# Patient Record
Sex: FEMALE | Race: BLACK OR AFRICAN AMERICAN | NOT HISPANIC OR LATINO | Employment: FULL TIME | RURAL
[De-identification: names, ages, dates, MRNs, and addresses within clinical notes are randomized per-mention and may not be internally consistent; named-entity substitution may affect disease eponyms.]

---

## 2017-02-28 ENCOUNTER — OFFICE VISIT (OUTPATIENT)
Dept: RETAIL CLINIC | Facility: CLINIC | Age: 32
End: 2017-02-28

## 2017-02-28 VITALS
OXYGEN SATURATION: 99 % | TEMPERATURE: 98.2 F | HEIGHT: 62 IN | SYSTOLIC BLOOD PRESSURE: 124 MMHG | DIASTOLIC BLOOD PRESSURE: 60 MMHG | HEART RATE: 80 BPM | BODY MASS INDEX: 34.23 KG/M2 | WEIGHT: 186 LBS

## 2017-02-28 DIAGNOSIS — H10.023 PINK EYE, BILATERAL: Primary | ICD-10-CM

## 2017-02-28 DIAGNOSIS — H66.002 ACUTE SUPPURATIVE OTITIS MEDIA OF LEFT EAR WITHOUT SPONTANEOUS RUPTURE OF TYMPANIC MEMBRANE, RECURRENCE NOT SPECIFIED: ICD-10-CM

## 2017-02-28 PROCEDURE — 99213 OFFICE O/P EST LOW 20 MIN: CPT | Performed by: NURSE PRACTITIONER

## 2017-02-28 RX ORDER — GENTAMICIN SULFATE 3 MG/ML
2 SOLUTION/ DROPS OPHTHALMIC 4 TIMES DAILY
Qty: 5 ML | Refills: 0 | Status: SHIPPED | OUTPATIENT
Start: 2017-02-28 | End: 2017-03-07

## 2017-02-28 RX ORDER — CEFDINIR 300 MG/1
300 CAPSULE ORAL 2 TIMES DAILY
Qty: 20 CAPSULE | Refills: 0 | Status: SHIPPED | OUTPATIENT
Start: 2017-02-28 | End: 2017-03-16 | Stop reason: SDUPTHER

## 2017-02-28 NOTE — PATIENT INSTRUCTIONS
Otitis Media, Adult  Otitis media is redness, soreness, and inflammation of the middle ear. Otitis media may be caused by allergies or, most commonly, by infection. Often it occurs as a complication of the common cold.  SIGNS AND SYMPTOMS  Symptoms of otitis media may include:  · Earache.  · Fever.  · Ringing in your ear.  · Headache.  · Leakage of fluid from the ear.  DIAGNOSIS  To diagnose otitis media, your health care provider will examine your ear with an otoscope. This is an instrument that allows your health care provider to see into your ear in order to examine your eardrum. Your health care provider also will ask you questions about your symptoms.  TREATMENT   Typically, otitis media resolves on its own within 3-5 days. Your health care provider may prescribe medicine to ease your symptoms of pain. If otitis media does not resolve within 5 days or is recurrent, your health care provider may prescribe antibiotic medicines if he or she suspects that a bacterial infection is the cause.  HOME CARE INSTRUCTIONS   · If you were prescribed an antibiotic medicine, finish it all even if you start to feel better.  · Take medicines only as directed by your health care provider.  · Keep all follow-up visits as directed by your health care provider.  SEEK MEDICAL CARE IF:  · You have otitis media only in one ear, or bleeding from your nose, or both.  · You notice a lump on your neck.  · You are not getting better in 3-5 days.  · You feel worse instead of better.  SEEK IMMEDIATE MEDICAL CARE IF:   · You have pain that is not controlled with medicine.  · You have swelling, redness, or pain around your ear or stiffness in your neck.  · You notice that part of your face is paralyzed.  · You notice that the bone behind your ear (mastoid) is tender when you touch it.  MAKE SURE YOU:   · Understand these instructions.  · Will watch your condition.  · Will get help right away if you are not doing well or get worse.     This  information is not intended to replace advice given to you by your health care provider. Make sure you discuss any questions you have with your health care provider.     Document Released: 09/22/2005 Document Revised: 01/08/2016 Document Reviewed: 07/15/2014  [employer] Interactive Patient Education ©2016 [employer]  Bacterial Conjunctivitis  Bacterial conjunctivitis, commonly called pink eye, is an inflammation of the clear membrane that covers the white part of the eye (conjunctiva). The inflammation can also happen on the underside of the eyelids. The blood vessels in the conjunctiva become inflamed, causing the eye to become red or pink. Bacterial conjunctivitis may spread easily from one eye to another and from person to person (contagious).   CAUSES   Bacterial conjunctivitis is caused by bacteria. The bacteria may come from your own skin, your upper respiratory tract, or from someone else with bacterial conjunctivitis.  SYMPTOMS   The normally white color of the eye or the underside of the eyelid is usually pink or red. The pink eye is usually associated with irritation, tearing, and some sensitivity to light. Bacterial conjunctivitis is often associated with a thick, yellowish discharge from the eye. The discharge may turn into a crust on the eyelids overnight, which causes your eyelids to stick together. If a discharge is present, there may also be some blurred vision in the affected eye.  DIAGNOSIS   Bacterial conjunctivitis is diagnosed by your caregiver through an eye exam and the symptoms that you report. Your caregiver looks for changes in the surface tissues of your eyes, which may point to the specific type of conjunctivitis. A sample of any discharge may be collected on a cotton-tip swab if you have a severe case of conjunctivitis, if your cornea is affected, or if you keep getting repeat infections that do not respond to treatment. The sample will be sent to a lab to see if the inflammation is  caused by a bacterial infection and to see if the infection will respond to antibiotic medicines.  TREATMENT   · Bacterial conjunctivitis is treated with antibiotics. Antibiotic eyedrops are most often used. However, antibiotic ointments are also available. Antibiotics pills are sometimes used. Artificial tears or eye washes may ease discomfort.  HOME CARE INSTRUCTIONS   · To ease discomfort, apply a cool, clean washcloth to your eye for 10-20 minutes, 3-4 times a day.  · Gently wipe away any drainage from your eye with a warm, wet washcloth or a cotton ball.  · Wash your hands often with soap and water. Use paper towels to dry your hands.  · Do not share towels or washcloths. This may spread the infection.  · Change or wash your pillowcase every day.  · You should not use eye makeup until the infection is gone.  · Do not operate machinery or drive if your vision is blurred.  · Stop using contact lenses. Ask your caregiver how to sterilize or replace your contacts before using them again. This depends on the type of contact lenses that you use.  · When applying medicine to the infected eye, do not touch the edge of your eyelid with the eyedrop bottle or ointment tube.  SEEK IMMEDIATE MEDICAL CARE IF:   · Your infection has not improved within 3 days after beginning treatment.  · You had yellow discharge from your eye and it returns.  · You have increased eye pain.  · Your eye redness is spreading.  · Your vision becomes blurred.  · You have a fever or persistent symptoms for more than 2-3 days.  · You have a fever and your symptoms suddenly get worse.  · You have facial pain, redness, or swelling.  MAKE SURE YOU:   · Understand these instructions.  · Will watch your condition.  · Will get help right away if you are not doing well or get worse.     This information is not intended to replace advice given to you by your health care provider. Make sure you discuss any questions you have with your health care  provider.     Document Released: 12/18/2006 Document Revised: 01/08/2016 Document Reviewed: 09/29/2016  [employer] Interactive Patient Education ©2016 [employer]

## 2017-02-28 NOTE — PROGRESS NOTES
Subjective   Keisha Oconnell is a 31 y.o. female.     Earache    There is pain in the left ear. This is a new problem. The current episode started in the past 7 days. The problem occurs constantly. The problem has been unchanged. There has been no fever. The pain is at a severity of 6/10. The pain is mild. Associated symptoms include rhinorrhea and a sore throat ( last week, better now). Pertinent negatives include no abdominal pain, coughing, diarrhea, ear discharge, headaches, hearing loss, neck pain, rash or vomiting. She has tried nothing for the symptoms. There is no history of a chronic ear infection or a tympanostomy tube.   Conjunctivitis    The current episode started yesterday. The problem occurs continuously. The problem has been gradually worsening. The problem is mild. Nothing relieves the symptoms. Nothing aggravates the symptoms. Associated symptoms include decreased vision ( blurry), eye itching, congestion, ear pain, rhinorrhea, sore throat ( last week, better now), URI, eye discharge and eye redness. Pertinent negatives include no fever, no double vision, no photophobia, no abdominal pain, no diarrhea, no nausea, no vomiting, no ear discharge, no headaches, no hearing loss, no mouth sores, no stridor, no swollen glands, no neck pain, no cough, no rash and no eye pain.        The following portions of the patient's history were reviewed and updated as appropriate: allergies, current medications, past medical history and past social history.    Review of Systems   Constitutional: Negative for activity change, appetite change and fever.   HENT: Positive for congestion, ear pain, rhinorrhea and sore throat ( last week, better now). Negative for ear discharge, hearing loss and mouth sores.    Eyes: Positive for discharge, redness and itching. Negative for double vision, photophobia and pain.   Respiratory: Negative for cough, chest tightness and stridor.    Cardiovascular: Negative.    Gastrointestinal:  "Negative for abdominal pain, diarrhea, nausea and vomiting.   Musculoskeletal: Negative for myalgias, neck pain and neck stiffness.   Skin: Negative.  Negative for rash.   Neurological: Negative for dizziness and headaches.   Hematological: Negative for adenopathy.   Psychiatric/Behavioral: Negative.        Objective    Visit Vitals   • /60 (BP Location: Left arm, Patient Position: Sitting, Cuff Size: Adult)   • Pulse 80   • Temp 98.2 °F (36.8 °C) (Tympanic)   • Ht 62\" (157.5 cm)   • Wt 186 lb (84.4 kg)   • LMP 02/01/2017   • SpO2 99%   • BMI 34.02 kg/m2       Physical Exam   Constitutional: She is oriented to person, place, and time. She appears well-developed. No distress.   HENT:   Head: Normocephalic and atraumatic.   Right Ear: Tympanic membrane and ear canal normal.   Left Ear: Ear canal normal. Tympanic membrane is erythematous and bulging.   Nose: Mucosal edema ( mildly injected, stuffed) present. Right sinus exhibits no maxillary sinus tenderness and no frontal sinus tenderness. Left sinus exhibits no maxillary sinus tenderness and no frontal sinus tenderness.   Mouth/Throat: Uvula is midline, oropharynx is clear and moist and mucous membranes are normal.   Eyes: Right eye exhibits no discharge. Left eye exhibits discharge ( purulent).   Conjunctiva injected bilaterally     Neck: Normal range of motion. Neck supple.   Cardiovascular: Normal rate and regular rhythm.    Pulmonary/Chest: Effort normal and breath sounds normal. She has no wheezes. She has no rales.   Lymphadenopathy:     She has no cervical adenopathy.   Neurological: She is alert and oriented to person, place, and time.   Psychiatric: She has a normal mood and affect. Her behavior is normal.   Nursing note and vitals reviewed.      Assessment/Plan   Kiesha was seen today for earache and facial swelling.    Diagnoses and all orders for this visit:    Pink eye, bilateral  Comments:  Left>Right  Orders:  -     gentamicin (GARAMYCIN) 0.3 % " ophthalmic solution; Administer 2 drops to both eyes 4 (Four) Times a Day for 7 days.    Acute suppurative otitis media of left ear without spontaneous rupture of tympanic membrane, recurrence not specified  -     cefdinir (OMNICEF) 300 MG capsule; Take 1 capsule by mouth 2 (Two) Times a Day.      Push fluids  Rest  Tylenol or Motrin as needed  Good handwashing.  Switch out pillowcases.    Discard any recently used eye makeup, contacts.     OTC decongestant of choice.      PCP or eye doctor if eyes not improving in the next 48-72 hours.  PCP if earache persists.      RTW: 3-1-17

## 2017-03-16 ENCOUNTER — DOCUMENTATION (OUTPATIENT)
Dept: RETAIL CLINIC | Facility: CLINIC | Age: 32
End: 2017-03-16

## 2017-03-16 DIAGNOSIS — H66.002 ACUTE SUPPURATIVE OTITIS MEDIA OF LEFT EAR WITHOUT SPONTANEOUS RUPTURE OF TYMPANIC MEMBRANE, RECURRENCE NOT SPECIFIED: ICD-10-CM

## 2017-03-16 RX ORDER — CEFDINIR 300 MG/1
300 CAPSULE ORAL 2 TIMES DAILY
Qty: 20 CAPSULE | Refills: 0 | Status: SHIPPED | OUTPATIENT
Start: 2017-03-16 | End: 2017-05-12

## 2017-03-16 NOTE — PROGRESS NOTES
"Patient seen on 2-28-17 for left OM.  Reports she is still having some pain and \"fullness\".  Will refill Omnicef.  Recommend she continue with Sudafed.  If symptoms persist, see PCP for further evaluation and treatment.   "

## 2017-05-12 ENCOUNTER — OFFICE VISIT (OUTPATIENT)
Dept: RETAIL CLINIC | Facility: CLINIC | Age: 32
End: 2017-05-12

## 2017-05-12 VITALS
HEIGHT: 62 IN | OXYGEN SATURATION: 99 % | RESPIRATION RATE: 16 BRPM | DIASTOLIC BLOOD PRESSURE: 84 MMHG | WEIGHT: 179 LBS | BODY MASS INDEX: 32.94 KG/M2 | TEMPERATURE: 98.7 F | HEART RATE: 84 BPM | SYSTOLIC BLOOD PRESSURE: 120 MMHG

## 2017-05-12 DIAGNOSIS — L84 CORN OR CALLUS: Primary | ICD-10-CM

## 2017-05-12 PROCEDURE — 99212 OFFICE O/P EST SF 10 MIN: CPT | Performed by: NURSE PRACTITIONER

## 2017-05-17 ENCOUNTER — OFFICE VISIT (OUTPATIENT)
Dept: RETAIL CLINIC | Facility: CLINIC | Age: 32
End: 2017-05-17

## 2017-05-17 VITALS
DIASTOLIC BLOOD PRESSURE: 78 MMHG | TEMPERATURE: 99.9 F | HEART RATE: 102 BPM | HEIGHT: 62 IN | OXYGEN SATURATION: 99 % | SYSTOLIC BLOOD PRESSURE: 118 MMHG | WEIGHT: 180 LBS | RESPIRATION RATE: 18 BRPM | BODY MASS INDEX: 33.13 KG/M2

## 2017-05-17 DIAGNOSIS — J06.9 URI WITH COUGH AND CONGESTION: Primary | ICD-10-CM

## 2017-05-17 DIAGNOSIS — L08.9 INFECTION OF SKIN OF TOES: ICD-10-CM

## 2017-05-17 PROCEDURE — 99213 OFFICE O/P EST LOW 20 MIN: CPT | Performed by: NURSE PRACTITIONER

## 2017-05-17 RX ORDER — CEPHALEXIN 500 MG/1
500 CAPSULE ORAL 2 TIMES DAILY
Qty: 20 CAPSULE | Refills: 0 | Status: SHIPPED | OUTPATIENT
Start: 2017-05-17 | End: 2021-03-25 | Stop reason: HOSPADM

## 2017-05-17 RX ORDER — BROMPHENIRAMINE MALEATE, PSEUDOEPHEDRINE HYDROCHLORIDE, AND DEXTROMETHORPHAN HYDROBROMIDE 2; 30; 10 MG/5ML; MG/5ML; MG/5ML
SYRUP ORAL
Qty: 240 ML | Refills: 0 | Status: SHIPPED | OUTPATIENT
Start: 2017-05-17 | End: 2021-03-25 | Stop reason: HOSPADM

## 2017-05-30 ENCOUNTER — DOCUMENTATION (OUTPATIENT)
Dept: RETAIL CLINIC | Facility: CLINIC | Age: 32
End: 2017-05-30

## 2021-03-25 ENCOUNTER — OFFICE VISIT (OUTPATIENT)
Dept: OBSTETRICS AND GYNECOLOGY | Facility: CLINIC | Age: 36
End: 2021-03-25

## 2021-03-25 VITALS
HEIGHT: 62 IN | BODY MASS INDEX: 39.75 KG/M2 | WEIGHT: 216 LBS | DIASTOLIC BLOOD PRESSURE: 70 MMHG | SYSTOLIC BLOOD PRESSURE: 124 MMHG

## 2021-03-25 DIAGNOSIS — Z12.4 ENCOUNTER FOR PAPANICOLAOU SMEAR FOR CERVICAL CANCER SCREENING: ICD-10-CM

## 2021-03-25 DIAGNOSIS — Z01.411 ENCOUNTER FOR WELL WOMAN EXAM WITH ABNORMAL FINDINGS: Primary | ICD-10-CM

## 2021-03-25 DIAGNOSIS — Z11.3 SCREENING EXAMINATION FOR SEXUALLY TRANSMITTED DISEASE: ICD-10-CM

## 2021-03-25 DIAGNOSIS — N91.1 SECONDARY AMENORRHEA: ICD-10-CM

## 2021-03-25 PROCEDURE — 87591 N.GONORRHOEAE DNA AMP PROB: CPT | Performed by: NURSE PRACTITIONER

## 2021-03-25 PROCEDURE — 87491 CHLMYD TRACH DNA AMP PROBE: CPT | Performed by: NURSE PRACTITIONER

## 2021-03-25 PROCEDURE — 87661 TRICHOMONAS VAGINALIS AMPLIF: CPT | Performed by: NURSE PRACTITIONER

## 2021-03-25 PROCEDURE — 99214 OFFICE O/P EST MOD 30 MIN: CPT | Performed by: NURSE PRACTITIONER

## 2021-03-25 PROCEDURE — 99385 PREV VISIT NEW AGE 18-39: CPT | Performed by: NURSE PRACTITIONER

## 2021-03-25 RX ORDER — LOSARTAN POTASSIUM 100 MG/1
TABLET ORAL
COMMUNITY
Start: 2021-02-24

## 2021-03-25 RX ORDER — METOPROLOL SUCCINATE 25 MG/1
TABLET, EXTENDED RELEASE ORAL
COMMUNITY
Start: 2021-02-24

## 2021-03-25 RX ORDER — ATORVASTATIN CALCIUM 20 MG/1
TABLET, FILM COATED ORAL
COMMUNITY
Start: 2021-03-01

## 2021-03-25 RX ORDER — PHENTERMINE HYDROCHLORIDE 30 MG/1
30 CAPSULE ORAL EVERY MORNING
COMMUNITY

## 2021-03-25 NOTE — PROGRESS NOTES
Subjective   Keisha Oconnell is a 35 y.o. here for gyn annual    LMP: unsure, spotting last   BC: none  Pap: Unsure, possibly in      Keisha Oconnell is a 35 yr old  who presents today for a gyn annual. She is a new patient. Is unsure of her last period, possibly in 2020. Has had irregular periods for years. Denies going without a year without a period. Desires STI screening today. BMI 39; takes phetermine for weight loss. Was previously seen at [facility] and [place] for her irregular periods.     Gynecologic Exam  The patient's pertinent negatives include no genital itching, genital lesions, genital odor, genital rash, missed menses, pelvic pain, vaginal bleeding or vaginal discharge. Pertinent negatives include no abdominal pain, chills, constipation, diarrhea, dysuria, fever, frequency, nausea, rash or sore throat. She is sexually active. No, her partner does not have an STD. She uses nothing for contraception. Her menstrual history has been irregular. There is no history of a  section, endometriosis or an STD.   Amenorrhea  This is a chronic problem. The current episode started more than 1 month ago. The problem occurs constantly. The problem has been unchanged. Pertinent negatives include no abdominal pain, chest pain, chills, fatigue, fever, nausea, rash, sore throat or weakness. Nothing aggravates the symptoms. Treatments tried: Provera in the past. The treatment provided mild relief.       The following portions of the patient's history were reviewed and updated as appropriate: allergies, current medications, past family history, past medical history, past social history, past surgical history and problem list.    Review of Systems   Constitutional: Negative for chills, fatigue, fever, unexpected weight gain and unexpected weight loss.   HENT: Negative for sneezing and sore throat.    Respiratory: Negative for shortness of breath.    Cardiovascular: Negative for  chest pain and palpitations.   Gastrointestinal: Negative for abdominal pain, constipation, diarrhea and nausea.   Endocrine: Negative for cold intolerance and heat intolerance.   Genitourinary: Positive for amenorrhea and menstrual problem. Negative for breast discharge, breast lump, breast pain, difficulty urinating, dysuria, frequency, missed menses, pelvic pain, pelvic pressure, urinary incontinence, vaginal bleeding, vaginal discharge and vaginal pain.   Skin: Negative for rash.   Neurological: Negative for weakness and headache.   Psychiatric/Behavioral: Negative for sleep disturbance, depressed mood and stress.       Objective   Physical Exam  Vitals and nursing note reviewed. Exam conducted with a chaperone present.   Constitutional:       Appearance: She is well-developed. She is obese.   HENT:      Head: Normocephalic and atraumatic.   Eyes:      Conjunctiva/sclera: Conjunctivae normal.   Neck:      Thyroid: No thyromegaly.   Cardiovascular:      Rate and Rhythm: Normal rate and regular rhythm.      Heart sounds: Normal heart sounds.   Pulmonary:      Effort: Pulmonary effort is normal. No respiratory distress.      Breath sounds: Normal breath sounds.   Chest:      Breasts:         Right: No swelling, bleeding, inverted nipple, mass, nipple discharge, skin change or tenderness.         Left: No swelling, bleeding, inverted nipple, mass, nipple discharge, skin change or tenderness.   Abdominal:      General: Bowel sounds are normal. There is no distension.      Palpations: Abdomen is soft.      Tenderness: There is no abdominal tenderness.   Genitourinary:     General: Normal vulva.      Labia:         Right: No rash, tenderness, lesion or injury.         Left: No rash, tenderness, lesion or injury.       Vagina: Normal.      Cervix: Normal.      Uterus: Normal.       Adnexa: Right adnexa normal and left adnexa normal.      Rectum: Normal.      Comments: Pap cotesting and G/C collected.   Musculoskeletal:          General: Normal range of motion.      Cervical back: Normal range of motion and neck supple.   Skin:     General: Skin is warm and dry.   Neurological:      Mental Status: She is alert and oriented to person, place, and time.   Psychiatric:         Behavior: Behavior normal.           Assessment/Plan   Diagnoses and all orders for this visit:    1. Encounter for well woman exam with abnormal findings (Primary)    2. Encounter for Papanicolaou smear for cervical cancer screening  -     Liquid-based Pap Smear, Screening    3. Secondary amenorrhea  -     DHEA-Sulfate  -     Follicle Stimulating Hormone  -     hCG, Serum, Qualitative  -     Hemoglobin A1c  -     Luteinizing Hormone  -     Prolactin  -     Sex Horm Binding Globulin  -     Testosterone (Free & Total), LC / MS  -     TSH  -     US Non-ob Transvaginal; Future  -     Estradiol    4. Screening examination for sexually transmitted disease  -     Chlamydia trachomatis, Neisseria gonorrhoeae, Trichomonas vaginalis, PCR - Swab, Cervix  -     Hepatitis B Surface Antigen  -     Hepatitis C Antibody  -     HIV-1 & HIV-2 Antibodies  -     Cancel: RPR Quant  -     RPR    Other orders  -     Cancel: DHEA-Sulfate  -     Cancel: Follicle Stimulating Hormone  -     Cancel: hCG, Serum, Qualitative  -     Cancel: Hemoglobin A1c  -     Cancel: Luteinizing Hormone  -     Cancel: US Non-ob Transvaginal; Future  -     Cancel: Sex Horm Binding Globulin  -     Cancel: Testosterone (Free & Total), LC / MS  -     Cancel: TSH  -     Cancel: Prolactin          Work-up for secondary amenorrhea including US and labwork; pt to follow up in 2 weeks. Will request records from [place] and [place] Discussed need for EMB to rule out hyperplasia/uterine malignancy due to amenorrhea and obesity. Reviewed pap smear screening guidelines and breast awareness. Will call pt with abnormal result of STI screening.

## 2021-03-26 LAB
C TRACH RRNA CVX QL NAA+PROBE: NEGATIVE
N GONORRHOEA RRNA SPEC QL NAA+PROBE: NEGATIVE
TRICHOMONAS VAGINALIS PCR: NEGATIVE

## 2021-03-29 PROBLEM — E11.9 TYPE 2 DIABETES MELLITUS: Status: ACTIVE | Noted: 2017-05-24

## 2021-03-29 PROBLEM — E28.8 FEMALE HYPERGONADOTROPIC HYPOGONADISM: Status: ACTIVE | Noted: 2021-03-29

## 2021-03-29 PROBLEM — E28.39 PREMATURE OVARIAN FAILURE: Status: ACTIVE | Noted: 2017-05-24

## 2021-03-30 LAB
LAB AP CASE REPORT: NORMAL
PATH INTERP SPEC-IMP: NORMAL

## 2021-03-31 ENCOUNTER — LAB (OUTPATIENT)
Dept: LAB | Facility: HOSPITAL | Age: 36
End: 2021-03-31

## 2021-03-31 PROCEDURE — 86803 HEPATITIS C AB TEST: CPT | Performed by: NURSE PRACTITIONER

## 2021-03-31 PROCEDURE — 82627 DEHYDROEPIANDROSTERONE: CPT | Performed by: NURSE PRACTITIONER

## 2021-03-31 PROCEDURE — 83001 ASSAY OF GONADOTROPIN (FSH): CPT | Performed by: NURSE PRACTITIONER

## 2021-03-31 PROCEDURE — 84270 ASSAY OF SEX HORMONE GLOBUL: CPT | Performed by: NURSE PRACTITIONER

## 2021-03-31 PROCEDURE — 83002 ASSAY OF GONADOTROPIN (LH): CPT | Performed by: NURSE PRACTITIONER

## 2021-03-31 PROCEDURE — 84402 ASSAY OF FREE TESTOSTERONE: CPT | Performed by: NURSE PRACTITIONER

## 2021-03-31 PROCEDURE — 82670 ASSAY OF TOTAL ESTRADIOL: CPT | Performed by: NURSE PRACTITIONER

## 2021-03-31 PROCEDURE — 84443 ASSAY THYROID STIM HORMONE: CPT | Performed by: NURSE PRACTITIONER

## 2021-03-31 PROCEDURE — 87340 HEPATITIS B SURFACE AG IA: CPT | Performed by: NURSE PRACTITIONER

## 2021-03-31 PROCEDURE — G0432 EIA HIV-1/HIV-2 SCREEN: HCPCS | Performed by: NURSE PRACTITIONER

## 2021-03-31 PROCEDURE — 84703 CHORIONIC GONADOTROPIN ASSAY: CPT | Performed by: NURSE PRACTITIONER

## 2021-03-31 PROCEDURE — 86592 SYPHILIS TEST NON-TREP QUAL: CPT | Performed by: NURSE PRACTITIONER

## 2021-03-31 PROCEDURE — 87899 AGENT NOS ASSAY W/OPTIC: CPT | Performed by: NURSE PRACTITIONER

## 2021-03-31 PROCEDURE — 84146 ASSAY OF PROLACTIN: CPT | Performed by: NURSE PRACTITIONER

## 2021-03-31 PROCEDURE — 84403 ASSAY OF TOTAL TESTOSTERONE: CPT | Performed by: NURSE PRACTITIONER

## 2021-03-31 PROCEDURE — 83036 HEMOGLOBIN GLYCOSYLATED A1C: CPT | Performed by: NURSE PRACTITIONER

## 2021-04-01 DIAGNOSIS — N91.1 SECONDARY AMENORRHEA: ICD-10-CM

## 2021-04-01 LAB
ESTRADIOL SERPL HS-MCNC: <5 PG/ML
FSH SERPL-ACNC: 51.9 MIU/ML
HBA1C MFR BLD: 5.87 % (ref 4.8–5.6)
HBV SURFACE AG SERPL QL IA: NORMAL
HCG SERPL QL: NEGATIVE
HCV AB SER DONR QL: NORMAL
HIV1 P24 AG SER QL: NORMAL
HIV1+2 AB SER QL: NORMAL
HIV1+2 AB SER QL: NORMAL
LH SERPL-ACNC: 24.1 MIU/ML
PROLACTIN SERPL-MCNC: 9.48 NG/ML (ref 4.79–23.3)
RPR SER QL: NORMAL
TSH SERPL DL<=0.05 MIU/L-ACNC: 3.56 UIU/ML (ref 0.27–4.2)

## 2021-04-02 LAB
DHEA-S SERPL-MCNC: 85.1 UG/DL (ref 84.8–378)
SHBG SERPL-SCNC: 16.9 NMOL/L (ref 24.6–122)

## 2021-04-04 LAB
TESTOST FREE SERPL-MCNC: 1 PG/ML (ref 0–4.2)
TESTOST SERPL-MCNC: 20.9 NG/DL (ref 10–55)

## 2021-04-08 ENCOUNTER — OFFICE VISIT (OUTPATIENT)
Dept: OBSTETRICS AND GYNECOLOGY | Facility: CLINIC | Age: 36
End: 2021-04-08

## 2021-04-08 VITALS
DIASTOLIC BLOOD PRESSURE: 90 MMHG | SYSTOLIC BLOOD PRESSURE: 132 MMHG | BODY MASS INDEX: 39.93 KG/M2 | WEIGHT: 217 LBS | HEIGHT: 62 IN

## 2021-04-08 DIAGNOSIS — E28.39 PREMATURE OVARIAN FAILURE: Primary | ICD-10-CM

## 2021-04-08 PROCEDURE — 99213 OFFICE O/P EST LOW 20 MIN: CPT | Performed by: NURSE PRACTITIONER

## 2021-04-08 RX ORDER — MEDROXYPROGESTERONE ACETATE 10 MG/1
TABLET ORAL
Qty: 10 TABLET | Refills: 11 | Status: SHIPPED | OUTPATIENT
Start: 2021-04-08 | End: 2022-04-12

## 2021-04-08 RX ORDER — MEDROXYPROGESTERONE ACETATE 10 MG/1
TABLET ORAL
Qty: 10 TABLET | Refills: 11 | Status: SHIPPED | OUTPATIENT
Start: 2021-04-08 | End: 2021-04-08

## 2021-04-08 NOTE — PROGRESS NOTES
Subjective   Keisha Oconnell is a 35 y.o. here for follow up on labs    Keisha cOonnell is a 35 yr old who presents today for follow-up on labwork and ultrasound for secondary amenorrhea. Pt reports she was previously on HRT for POI but it has been more than a year. Accompanied by radha today. Expresses that she prefers to be on HRT versus birth control pills.       The following portions of the patient's history were reviewed and updated as appropriate: allergies, current medications, past family history, past medical history, past social history, past surgical history and problem list.    Review of Systems   Constitutional: Negative for chills, fatigue, fever, unexpected weight gain and unexpected weight loss.   HENT: Negative for sneezing and sore throat.    Respiratory: Negative for shortness of breath.    Cardiovascular: Negative for chest pain and palpitations.   Gastrointestinal: Negative for abdominal pain, constipation, diarrhea and nausea.   Endocrine: Positive for heat intolerance. Negative for cold intolerance.   Genitourinary: Positive for amenorrhea. Negative for breast discharge, breast lump, breast pain, difficulty urinating, dysuria, frequency, menstrual problem, pelvic pain, pelvic pressure, urinary incontinence, vaginal bleeding, vaginal discharge and vaginal pain.   Skin: Negative for rash.   Neurological: Negative for weakness and headache.   Psychiatric/Behavioral: Negative for sleep disturbance, depressed mood and stress.       Objective   Physical Exam  Vitals and nursing note reviewed.   Constitutional:       Appearance: She is well-developed. She is obese.   HENT:      Head: Normocephalic.   Pulmonary:      Effort: Pulmonary effort is normal.   Skin:     General: Skin is dry.   Neurological:      Mental Status: She is alert and oriented to person, place, and time.   Psychiatric:         Behavior: Behavior normal.           Assessment/Plan   Diagnoses and all orders for this visit:    1.  Premature ovarian failure (Primary)    Other orders  -     Discontinue: estrogens, conjugated, (Premarin) 0.625 MG tablet; Take 1 pill a day for 21 days then take Provera.  Dispense: 21 tablet; Refill: 12  -     Discontinue: medroxyPROGESTERone (Provera) 10 MG tablet; Take 1 tablet daily for 10 days.  Dispense: 10 tablet; Refill: 11  -     medroxyPROGESTERone (Provera) 10 MG tablet; Take 1 tablet for 10 days.  Dispense: 10 tablet; Refill: 11  -     Discontinue: estrogens, conjugated, (Premarin) 0.625 MG tablet; Take 1 tablet by mouth Daily. Take 1 tablet by mouth for 21 days and then take Provera for 10 days.  Dispense: 30 tablet; Refill: 12  -     estrogens, conjugated, (Premarin) 0.625 MG tablet; Take 1 tablet by mouth Daily. Take 1 tablet by mouth for 21 days and then take Provera for 10 days.  Dispense: 21 tablet; Refill: 11        Reviewed labwork; labs consistent with POI.  Pap is normal, repeat  In 5 years. Pt is prediabetic, encouraged continued weight loss and a healthy diet. Discussed treatment with HRT till the age of menopause which is around 51 years of age for prevention of osteoporosis and treatment for hot flashes. Pt agreeable to this plan. Discussed EMB is not indicated as pt has POI and pelvic ultrasound is WNL. Return in one year for refills.

## 2021-04-08 NOTE — PATIENT INSTRUCTIONS
Primary Ovarian Insufficiency    Primary ovarian insufficiency is a condition in which the ovaries stop working in women under age 40. The ovaries have a fixed number of eggs, which are stored in fluid-filled sacs (follicles). They also produce the female sex hormones, including estrogen. After puberty, female hormones trigger the release of an egg from a follicle (ovulation) each month. If the egg does not get fertilized by a sperm, a woman will have a menstrual period.  Throughout life, the number of follicles in the ovaries slowly declines. A condition called menopause occurs when there are no follicles left. When this occurs, ovulation stops and the level of estrogen drops. This is a natural process and occurs in all women by about age 55. If you have primary ovarian failure, the loss of follicles and ability to produce estrogen occurs at a much younger age.  What are the causes?  In most cases, the exact cause of this condition is not known. Some known causes include:  · Not having enough follicles. You are born with a certain number of follicles. You may not have enough to last 40 or more years.  · Cancer treatments that damage follicles. These include medicines (chemotherapy), high energy waves (radiation), or surgery.  · Follicles that do not respond to the brain hormone that tells follicles to release eggs (follicle-stimulating hormone or FSH).  · Having a genetic disorder that causes abnormal ovaries (De La Vega syndrome and fragile X syndrome).  · Having a condition in which your immune system attacks your ovaries (autoimmune disease).  What increases the risk?  You are more likely to develop this condition if:  · You smoke.  · You have diabetes.  · You have a family history of primary ovarian insufficiency.  What are the symptoms?  Symptoms of this condition include:  · Inability to get pregnant.  · Irregular or missed periods.  · Other symptoms can be caused by low levels of estrogen. They include:  ? Night  sweats.  ? Hot flashes.  ? Dry vagina, which can cause pain during sex.  ? Loss of interest in sex (low libido).  ? Irritability.  ? Trouble sleeping.  ? Confusion.  ? Thinning bones (osteoporosis), which may cause bones to break easily.  In many cases, there are no symptoms for this condition.  How is this diagnosed?  This condition may be diagnosed based on:  · Your symptoms. Your health care provider will ask you questions about your symptoms. He or she may suspect primary ovarian insufficiency if you have trouble getting pregnant or if you have irregular or missed periods.  · A physical exam.  · Blood tests. This is done to confirm the diagnosis. The test will check for:  ? Low levels of estrogen.  ? High levels of FSH. Follicle loss and low levels of estrogen will cause your brain to release more FSH.  ? Low levels of anti-Mullerian hormone (AMH). As follicles are lost, this hormone also decreases.  If you have low estrogen associated with low AMH and high FSH, your health care provider may do more tests to look for a possible cause of your primary ovarian insufficiency. These may include genetic testing and testing for autoimmune disease.  How is this treated?  This condition is treated with hormone replacement therapy, which replaces the female hormones estrogen and progesterone. These hormones may be given as oral medicines or as a skin patch. This treatment may:  · Relieve the symptoms of estrogen deficiency.  · Prevent osteoporosis.  · Protect from heart disease.  Treatment often goes on until around age 50 when menopause usually occurs. There is no treatment that can fully restore fertility. However, a small number of women can get pregnant after being diagnosed with this condition. If you desire to get pregnant now or in the future, talk to a fertility specialist right away about your options.  Follow these instructions at home:    · Take over-the-counter and prescription medicines only as told by your  health care provider.  · Ask your health care provider about the risks and side effects of hormone replacement therapy.  · Eat foods rich in calcium and vitamin D. These include milk, other dairy products, and orange juice or breakfast cereals that have vitamin D added (fortified). These may help to prevent osteoporosis.  · Do exercise regularly. This can make your muscles and bones stronger. Ask your health care provider which activities are safe for you.  · Do not use any products that contain nicotine or tobacco, such as cigarettes and e-cigarettes. If you need help quitting, ask your health care provider.  · Keep all follow-up visits as told by your health care provider. This is important.  Contact a health care provider if:  · You have symptoms of low estrogen.  · Your periods stop or are not regular.  · You have side effects from your medicines.  Get help right away if:  · You break (fracture) a bone.  · You have chest pain or trouble breathing.  Summary  · Primary ovarian insufficiency results when your ovaries stop working normally before age 40.  · In most cases, the exact cause of this condition is not known.  · The first sign of this disorder may be difficulty getting pregnant.  · Symptoms of this condition include irregular or missed periods and symptoms of low estrogen.  · This condition is treated with hormone replacement therapy. This reduces symptoms of low estrogen and protects from osteoporosis.  This information is not intended to replace advice given to you by your health care provider. Make sure you discuss any questions you have with your health care provider.  Document Revised: 04/10/2020 Document Reviewed: 03/27/2019  [employer] © 2021 [employer]

## 2022-04-12 RX ORDER — CONJUGATED ESTROGENS 0.62 MG/1
TABLET, FILM COATED ORAL
Qty: 21 TABLET | Refills: 11 | Status: SHIPPED | OUTPATIENT
Start: 2022-04-12

## 2022-04-12 RX ORDER — MEDROXYPROGESTERONE ACETATE 10 MG/1
TABLET ORAL
Qty: 10 TABLET | Refills: 11 | Status: SHIPPED | OUTPATIENT
Start: 2022-04-12 | End: 2022-08-10 | Stop reason: SDUPTHER

## 2022-04-12 RX ORDER — MEDROXYPROGESTERONE ACETATE 10 MG/1
TABLET ORAL
Qty: 10 TABLET | Refills: 0 | Status: SHIPPED | OUTPATIENT
Start: 2022-04-12 | End: 2022-04-12 | Stop reason: SDUPTHER

## 2022-08-10 RX ORDER — MEDROXYPROGESTERONE ACETATE 10 MG/1
TABLET ORAL
Qty: 10 TABLET | Refills: 0 | Status: SHIPPED | OUTPATIENT
Start: 2022-08-10

## 2023-02-20 RX ORDER — MEDROXYPROGESTERONE ACETATE 10 MG/1
TABLET ORAL
Qty: 10 TABLET | Refills: 0 | OUTPATIENT
Start: 2023-02-20

## 2023-04-13 ENCOUNTER — OFFICE VISIT (OUTPATIENT)
Dept: OBSTETRICS AND GYNECOLOGY | Facility: CLINIC | Age: 38
End: 2023-04-13
Payer: COMMERCIAL

## 2023-04-13 VITALS
WEIGHT: 213 LBS | DIASTOLIC BLOOD PRESSURE: 82 MMHG | SYSTOLIC BLOOD PRESSURE: 134 MMHG | HEIGHT: 62 IN | BODY MASS INDEX: 39.2 KG/M2

## 2023-04-13 DIAGNOSIS — R23.2 HOT FLASHES: ICD-10-CM

## 2023-04-13 DIAGNOSIS — E28.39 PREMATURE OVARIAN FAILURE: Primary | ICD-10-CM

## 2023-04-13 PROCEDURE — 99213 OFFICE O/P EST LOW 20 MIN: CPT | Performed by: NURSE PRACTITIONER

## 2023-04-13 RX ORDER — PAROXETINE 10 MG/1
10 TABLET, FILM COATED ORAL EVERY MORNING
Qty: 60 TABLET | Refills: 3 | Status: SHIPPED | OUTPATIENT
Start: 2023-04-13

## 2023-04-13 RX ORDER — ERGOCALCIFEROL 1.25 MG/1
CAPSULE ORAL
COMMUNITY
Start: 2023-02-21

## 2023-04-13 RX ORDER — HYDROCHLOROTHIAZIDE 25 MG/1
TABLET ORAL
COMMUNITY
Start: 2023-01-04

## 2023-04-13 NOTE — PROGRESS NOTES
Subjective   Keisha Oconnell is a 37 y.o. here for refill on hormones for POI    History of Present Illness  Keisha Oconnell is a 37 yr old  postmenopausal female with hx of POI presents today for refills on hormones. States the cost of her Premarin went from $50 to $170. She went without the pills for a few months, but feels she is having hot flashes. Pt, however, had Covid at the end of 2021 complicated by pulmonary embolism. She was on Premarin the entire time this occurred and while her blood clot was managed. She recently got off Xarelto, was told by her PCP she no longer needs them. She is no longer on Lipitor anymore, was told she does not have high cholesterol. She has started exercising and losing weight. She is considering IVF.         The following portions of the patient's history were reviewed and updated as appropriate: allergies, current medications, past family history, past medical history, past social history, past surgical history and problem list.    Review of Systems   Constitutional: Negative for chills, fatigue and fever.   Respiratory: Negative for shortness of breath.    Cardiovascular: Negative for chest pain and palpitations.   Gastrointestinal: Negative for abdominal pain, constipation, diarrhea and nausea.   Endocrine: Positive for heat intolerance. Negative for cold intolerance.   Genitourinary: Negative for dysuria, frequency, menstrual problem, pelvic pressure, vaginal bleeding, vaginal discharge and vaginal pain.   Neurological: Negative for headache.   Psychiatric/Behavioral: Negative for depressed mood.       Objective   Physical Exam  Vitals and nursing note reviewed.   Constitutional:       Appearance: Normal appearance.   Pulmonary:      Effort: Pulmonary effort is normal.   Neurological:      Mental Status: She is alert.   Psychiatric:         Mood and Affect: Mood normal.         Behavior: Behavior normal.           Assessment & Plan   Diagnoses and all orders for  this visit:    1. Premature ovarian failure (Primary)    2. Hot flashes  -     PARoxetine (Paxil) 10 MG tablet; Take 1 tablet by mouth Every Morning.  Dispense: 60 tablet; Refill: 3        Counseled patient that estrogen HRT is now contraindicated for because of the hx of PE. Recommend nonhormonal option such as Paxil.  Counseled on taking vitamin D and calcium supplements and weight bearing exercise for bone health. Pt may schedule a consultation with a fertility specialist; discussed [facility] or [facility] as options. Return as needed or in 1 year.